# Patient Record
Sex: MALE | Race: WHITE
[De-identification: names, ages, dates, MRNs, and addresses within clinical notes are randomized per-mention and may not be internally consistent; named-entity substitution may affect disease eponyms.]

---

## 2018-05-09 ENCOUNTER — HOSPITAL ENCOUNTER (OUTPATIENT)
Dept: HOSPITAL 92 - RAD | Age: 48
Discharge: HOME | End: 2018-05-09
Attending: NEUROLOGICAL SURGERY
Payer: OTHER GOVERNMENT

## 2018-05-09 VITALS — TEMPERATURE: 97.8 F | SYSTOLIC BLOOD PRESSURE: 108 MMHG | DIASTOLIC BLOOD PRESSURE: 75 MMHG

## 2018-05-09 VITALS — BODY MASS INDEX: 32.3 KG/M2

## 2018-05-09 DIAGNOSIS — Z98.1: ICD-10-CM

## 2018-05-09 DIAGNOSIS — M50.90: ICD-10-CM

## 2018-05-09 DIAGNOSIS — M54.12: Primary | ICD-10-CM

## 2018-05-09 PROCEDURE — B00B1ZZ PLAIN RADIOGRAPHY OF SPINAL CORD USING LOW OSMOLAR CONTRAST: ICD-10-PCS | Performed by: NEUROLOGICAL SURGERY

## 2018-05-09 PROCEDURE — 72126 CT NECK SPINE W/DYE: CPT

## 2018-05-09 PROCEDURE — 62302 MYELOGRAPHY LUMBAR INJECTION: CPT

## 2018-05-09 PROCEDURE — 72050 X-RAY EXAM NECK SPINE 4/5VWS: CPT

## 2018-05-09 NOTE — RAD
CERVICAL MYELOGRAM:

 

Date:  05/09/18 

 

HISTORY:  

Cervical radiculopathy. 

 

COMPARISON:  

None. 

 

EXPOSURE: 

0.7 minutes. 

560.4 mGy*cm^2. 

 

FINDINGS:

Initial 2 view  lumbar spine [id number] demonstrates five lumbar-type vertebral bodies. Vertebr
al body height is maintained. No fracture. Moderate degenerative change at L5-S1. 

 

Successful lumbar puncture for intrathecal contrast administration. A total of 8 mL of Isovue-M300 co
ntrast was administered. No immediate or  postprocedure complication. 

 

TECHNIQUE:  

Consent obtained for lumbar puncture for intrathecal contrast administration. The patient's back was 
evaluated. The L2-L3 level was deemed appropriate. The skin was prepped and draped in the sterile fas
hion. 1% lidocaine, buffered with sodium bicarbonate, was used for local anesthesia. Under fluoroscop
ic guidance, a 22 gauge spinal needle was advanced into the CSF space. The inner stylette was removed
. There is prompt flow of clear CSF. Via a short tubing catheter, a total of 8 mL of Isovue-200M cont
rast was administered intrathecally. The patient tolerated the procedure well. No immediate or postpr
ocedure complication. 

 

IMPRESSION: 

Successful cervical myelogram. 

 

 

 

 

 

POS: [facility]

## 2018-05-09 NOTE — RAD
CERVICAL SPINE FOUR VIEWS:

 

HISTORY:

Cervical disk disease status post fusion.

 

COMPARISON:

03/21/2017

 

FINDINGS:

AP, lateral, flexion, and extension views of the cervical spine were performed.  The patient is statu
s post anterior fusion of C5 through C7, with a plate and screws.  Disk spacers are in good position 
within the disk spaces.  The plate is no longer well opposed to the C7 vertebral body, and the screws
 appear to have backed out, compared to the prior examination.  There is an approximately 7 mm gap be
tween the plate and the anterior aspect of the vertebral body.  No prevertebral soft tissue swelling 
is seen.  The vertebral bodies demonstrate normal alignment without subluxation.  Alignment is unchan
ged with flexion and extension.  There may be slight widening of the gap between the inferior aspect 
of the plate and the C7 vertebral body with extension.

 

IMPRESSION:

1.  Post surgical changes of the cervical spine.

2.  There has been interval hardware loosening with the inferior aspect of the plate protruding more 
anteriorly, in relation to the C7 vertebral body.

 

POS: [facility]

## 2018-05-09 NOTE — CT
POST MYELOGRAM CERVICAL SPINE CT:

 

Date:  05/09/18 

 

HISTORY:  

Cervical radiculopathy. Cervical fusion. 

 

COMPARISON:  

None. 

 

TECHNIQUE:  

Post myelogram cervical spine CT is performed without contrast. Reformatted images are submitted for 
interpretation. 

 

FINDINGS:

 

There is an anterior fusion plate with transvertebral body screw at C5, C6, and C7. There is a gap be
tween the anterior fusion plate and the respective vertebral body at C7. This gap is approximately 4.
0 mm. 

 

There is no prevertebral soft tissue swelling. Soft tissue neck structures are unremarkable. 

 

Upper mediastinum and lung apices are also unremarkable. 

 

Cervical spine vertebral body height is maintained. No fracture. No craniocervical dissociation. Late
ral masses of C1 and C2 as well as the facets have appropriate articulation. Intact odontoid process.
 

 

C2-C3: 

No significant disc osteophyte complex. No significant central canal stenosis. Mild right foraminal n
arrowing due to degenerative changes on the uncovertebral joint. Minimal bilateral facet hypertrophy.
 Left neural foramen is patent. 

 

 

C3-C4: 

No significant disc osteophyte complex. No significant central canal stenosis. Right neural foramen i
s patent. Mild left foraminal narrowing due to degenerative change of the uncovertebral joint and fac
et hypertrophy. 

 

C4-C5: 

No significant disc osteophyte complex. No significant central canal stenosis. Right neural foramen i
s patent. Mild left foraminal narrowing due to left facet hypertrophy. There is 3.4 mm anterolisthesi
s of C4 upon C5. 

 

C5-C6:

Broad based osteophyte ridge without significant central canal stenosis. Degenerative changes of the 
right uncovertebral joint result in moderate to severe right foraminal narrowing. Mild left foraminal
 narrowing due to degenerative change of the uncovertebral joint. Disc prosthesis is identified. 

 

C6-C7:

No significant osteophyte ridge. No significant central canal stenosis. Degenerative changes of the r
ight uncovertebral joint results in mild to moderate right foraminal narrowing. Left neural foramen i
s minimally narrowed due to degenerative changes of the uncovertebral joint. Disc prosthesis is ident
ified. 

 

C7-T1:

No significant central canal stenosis or foraminal narrowing. 

 

IMPRESSION: 

Cervical fusion changes as above. The cervical fusion plate is no longer flush with the C7 vertebral 
body, which is similar to the previous radiographs. 

 

Varying degrees of central canal stenosis and foraminal narrowing, as above. 

 

POS: [facility]